# Patient Record
Sex: MALE | Race: WHITE | Employment: FULL TIME | URBAN - METROPOLITAN AREA
[De-identification: names, ages, dates, MRNs, and addresses within clinical notes are randomized per-mention and may not be internally consistent; named-entity substitution may affect disease eponyms.]

---

## 2017-01-13 PROBLEM — J18.9 PNEUMONIA OF RIGHT MIDDLE LOBE DUE TO INFECTIOUS ORGANISM: Status: ACTIVE | Noted: 2017-01-13

## 2021-11-16 PROBLEM — J18.9 PNEUMONIA OF RIGHT MIDDLE LOBE DUE TO INFECTIOUS ORGANISM: Status: RESOLVED | Noted: 2017-01-13 | Resolved: 2021-11-16

## 2021-11-29 PROBLEM — Z13.220 SCREENING FOR LIPID DISORDERS: Status: ACTIVE | Noted: 2021-11-29

## 2021-11-29 PROBLEM — R94.31 Q WAVES SUGGESTIVE OF PREVIOUS MYOCARDIAL INFARCTION: Status: ACTIVE | Noted: 2021-11-29

## 2021-11-29 PROBLEM — R07.9 CHEST PAIN: Status: ACTIVE | Noted: 2021-11-29

## 2021-11-30 ENCOUNTER — HOSPITAL ENCOUNTER (OUTPATIENT)
Dept: LAB | Age: 46
Discharge: HOME | End: 2021-11-30
Payer: COMMERCIAL

## 2021-11-30 DIAGNOSIS — R07.9 CHEST PAIN, UNSPECIFIED TYPE: ICD-10-CM

## 2021-11-30 DIAGNOSIS — Z13.220 SCREENING FOR LIPID DISORDERS: ICD-10-CM

## 2021-11-30 LAB
ANION GAP SERPL CALC-SCNC: 8 MMOL/L (ref 7–16)
BASOPHILS # BLD: 0.1 K/UL (ref 0–0.2)
BASOPHILS NFR BLD: 1 % (ref 0–2)
BUN SERPL-MCNC: 14 MG/DL (ref 6–23)
CALCIUM SERPL-MCNC: 9.3 MG/DL (ref 8.3–10.4)
CHLORIDE SERPL-SCNC: 105 MMOL/L (ref 98–107)
CHOLEST SERPL-MCNC: 168 MG/DL
CO2 SERPL-SCNC: 26 MMOL/L (ref 21–32)
CREAT SERPL-MCNC: 0.97 MG/DL (ref 0.8–1.5)
DIFFERENTIAL METHOD BLD: NORMAL
EOSINOPHIL # BLD: 0.3 K/UL (ref 0–0.8)
EOSINOPHIL NFR BLD: 4 % (ref 0.5–7.8)
ERYTHROCYTE [DISTWIDTH] IN BLOOD BY AUTOMATED COUNT: 12.2 % (ref 11.9–14.6)
GLUCOSE SERPL-MCNC: 128 MG/DL (ref 65–100)
HCT VFR BLD AUTO: 43.8 % (ref 41.1–50.3)
HDLC SERPL-MCNC: 27 MG/DL (ref 40–60)
HDLC SERPL: 6.2 {RATIO}
HGB BLD-MCNC: 15.1 G/DL (ref 13.6–17.2)
IMM GRANULOCYTES # BLD AUTO: 0 K/UL (ref 0–0.5)
IMM GRANULOCYTES NFR BLD AUTO: 0 % (ref 0–5)
INR PPP: 0.9
LDLC SERPL CALC-MCNC: ABNORMAL MG/DL
LDLC SERPL DIRECT ASSAY-MCNC: 70 MG/DL
LYMPHOCYTES # BLD: 1.7 K/UL (ref 0.5–4.6)
LYMPHOCYTES NFR BLD: 27 % (ref 13–44)
MCH RBC QN AUTO: 29.4 PG (ref 26.1–32.9)
MCHC RBC AUTO-ENTMCNC: 34.5 G/DL (ref 31.4–35)
MCV RBC AUTO: 85.4 FL (ref 79.6–97.8)
MONOCYTES # BLD: 0.3 K/UL (ref 0.1–1.3)
MONOCYTES NFR BLD: 5 % (ref 4–12)
NEUTS SEG # BLD: 4 K/UL (ref 1.7–8.2)
NEUTS SEG NFR BLD: 63 % (ref 43–78)
NRBC # BLD: 0 K/UL (ref 0–0.2)
PLATELET # BLD AUTO: 238 K/UL (ref 150–450)
PMV BLD AUTO: 10.8 FL (ref 9.4–12.3)
POTASSIUM SERPL-SCNC: 3.5 MMOL/L (ref 3.5–5.1)
PROTHROMBIN TIME: 12.9 SEC (ref 12.6–14.5)
RBC # BLD AUTO: 5.13 M/UL (ref 4.23–5.6)
SODIUM SERPL-SCNC: 139 MMOL/L (ref 138–145)
TRIGL SERPL-MCNC: 639 MG/DL (ref 35–150)
VLDLC SERPL CALC-MCNC: 127.8 MG/DL (ref 6–23)
WBC # BLD AUTO: 6.4 K/UL (ref 4.3–11.1)

## 2021-11-30 PROCEDURE — 85610 PROTHROMBIN TIME: CPT

## 2021-11-30 PROCEDURE — 36415 COLL VENOUS BLD VENIPUNCTURE: CPT

## 2021-11-30 PROCEDURE — 83721 ASSAY OF BLOOD LIPOPROTEIN: CPT

## 2021-11-30 PROCEDURE — 85025 COMPLETE CBC W/AUTO DIFF WBC: CPT

## 2021-11-30 PROCEDURE — 80048 BASIC METABOLIC PNL TOTAL CA: CPT

## 2021-11-30 PROCEDURE — 80061 LIPID PANEL: CPT

## 2021-12-01 NOTE — PROGRESS NOTES
Advised patient of lipid panel results and Dr. Dianelys Parry response. Advised patient to follow low saturated fat, low cholesterol diet. Patient verbalized understanding. He states he spoke with Tona Brito in [facility] lab, yesterday at 5:02 pm, and plans to call her back, now, to schedule cath for tomorrow. He states that he ate about 3 lbs of pork barbeque in few days prior to lipid panel.  Ashok Aljeandro RN

## 2021-12-01 NOTE — PROGRESS NOTES
Fifi Ann MD  [facility] Triage  His precath labs are acceptable for an angiogram; however, the patient is not scheduled for an angiogram for an unknown reason. His TG are elevated, so he will need a fasting lipid panel to be scheduled once he sees me after cath. [facility] facilitate scheduling the patient's angiogram and follow up with me post-cath.         Left message on voicemail for patient to call this triage nurse. Recording at home number states \"no longer in service\". Scheduling Dept was notified by Dinah Mo to schedule cath, earlier today.  Chanell Mace RN

## 2021-12-03 ENCOUNTER — HOSPITAL ENCOUNTER (OUTPATIENT)
Age: 46
Setting detail: OUTPATIENT SURGERY
Discharge: HOME | End: 2021-12-03
Attending: INTERNAL MEDICINE | Admitting: INTERNAL MEDICINE
Payer: COMMERCIAL

## 2021-12-03 VITALS
BODY MASS INDEX: 25.73 KG/M2 | SYSTOLIC BLOOD PRESSURE: 112 MMHG | OXYGEN SATURATION: 96 % | HEIGHT: 72 IN | WEIGHT: 190 LBS | HEART RATE: 78 BPM | DIASTOLIC BLOOD PRESSURE: 68 MMHG

## 2021-12-03 DIAGNOSIS — R94.31 Q WAVES SUGGESTIVE OF PREVIOUS MYOCARDIAL INFARCTION: ICD-10-CM

## 2021-12-03 DIAGNOSIS — R07.9 CHEST PAIN, UNSPECIFIED TYPE: ICD-10-CM

## 2021-12-03 DIAGNOSIS — R07.9 CHEST PAIN: ICD-10-CM

## 2021-12-03 LAB
ATRIAL RATE: 74 BPM
CALCULATED P AXIS, ECG09: 25 DEGREES
CALCULATED R AXIS, ECG10: 46 DEGREES
CALCULATED T AXIS, ECG11: 30 DEGREES
DIAGNOSIS, 93000: NORMAL
P-R INTERVAL, ECG05: 148 MS
Q-T INTERVAL, ECG07: 390 MS
QRS DURATION, ECG06: 86 MS
QTC CALCULATION (BEZET), ECG08: 432 MS
VENTRICULAR RATE, ECG03: 74 BPM

## 2021-12-03 PROCEDURE — 93458 L HRT ARTERY/VENTRICLE ANGIO: CPT | Performed by: INTERNAL MEDICINE

## 2021-12-03 PROCEDURE — 77030016699 HC CATH ANGI DX INFN1 CARD -A: Performed by: INTERNAL MEDICINE

## 2021-12-03 PROCEDURE — 74011000636 HC RX REV CODE- 636: Performed by: INTERNAL MEDICINE

## 2021-12-03 PROCEDURE — 74011250636 HC RX REV CODE- 250/636: Performed by: INTERNAL MEDICINE

## 2021-12-03 PROCEDURE — 77030042317 HC BND COMPR HEMSTAT -B: Performed by: INTERNAL MEDICINE

## 2021-12-03 PROCEDURE — 93005 ELECTROCARDIOGRAM TRACING: CPT | Performed by: INTERNAL MEDICINE

## 2021-12-03 PROCEDURE — 74011250637 HC RX REV CODE- 250/637: Performed by: INTERNAL MEDICINE

## 2021-12-03 PROCEDURE — 99152 MOD SED SAME PHYS/QHP 5/>YRS: CPT | Performed by: INTERNAL MEDICINE

## 2021-12-03 PROCEDURE — C1894 INTRO/SHEATH, NON-LASER: HCPCS | Performed by: INTERNAL MEDICINE

## 2021-12-03 PROCEDURE — 74011000250 HC RX REV CODE- 250: Performed by: INTERNAL MEDICINE

## 2021-12-03 PROCEDURE — 77030015766: Performed by: INTERNAL MEDICINE

## 2021-12-03 PROCEDURE — C1769 GUIDE WIRE: HCPCS | Performed by: INTERNAL MEDICINE

## 2021-12-03 RX ORDER — HEPARIN SODIUM 200 [USP'U]/100ML
INJECTION, SOLUTION INTRAVENOUS
Status: COMPLETED | OUTPATIENT
Start: 2021-12-03 | End: 2021-12-03

## 2021-12-03 RX ORDER — SODIUM CHLORIDE 9 MG/ML
75 INJECTION, SOLUTION INTRAVENOUS CONTINUOUS
Status: DISCONTINUED | OUTPATIENT
Start: 2021-12-03 | End: 2021-12-03 | Stop reason: HOSPADM

## 2021-12-03 RX ORDER — GUAIFENESIN 100 MG/5ML
81-324 LIQUID (ML) ORAL
Status: COMPLETED | OUTPATIENT
Start: 2021-12-03 | End: 2021-12-03

## 2021-12-03 RX ORDER — LIDOCAINE HYDROCHLORIDE 10 MG/ML
INJECTION INFILTRATION; PERINEURAL AS NEEDED
Status: DISCONTINUED | OUTPATIENT
Start: 2021-12-03 | End: 2021-12-03 | Stop reason: HOSPADM

## 2021-12-03 RX ORDER — MIDAZOLAM HYDROCHLORIDE 1 MG/ML
INJECTION, SOLUTION INTRAMUSCULAR; INTRAVENOUS AS NEEDED
Status: DISCONTINUED | OUTPATIENT
Start: 2021-12-03 | End: 2021-12-03 | Stop reason: HOSPADM

## 2021-12-03 RX ADMIN — SODIUM CHLORIDE 75 ML/HR: 900 INJECTION, SOLUTION INTRAVENOUS at 08:10

## 2021-12-03 RX ADMIN — ASPIRIN 324 MG: 81 TABLET, CHEWABLE ORAL at 08:10

## 2021-12-03 NOTE — Clinical Note
TRANSFER - IN REPORT:     Verbal report received from: [facility] RN . Report consisted of patient's Situation, Background, Assessment and   Recommendations(SBAR). Opportunity for questions and clarification was provided. Assessment completed upon patient's arrival to unit and care assumed. Patient transported with a Registered Nurse.

## 2021-12-03 NOTE — PROGRESS NOTES
1145-patient ambulated to bath room with no difficulties. Right radial with no bleeding or hematoma. R band removed and sterile dressing applied to site    1150- all discharge instructions explained to patient and family. Time allowed for questions no. No questions and concerns at this time. 1155- right radial checked. Site with no redness or bleeding. pt discharged to home via Wheelchair with family.

## 2021-12-03 NOTE — PROGRESS NOTES
TRANSFER - OUT REPORT:    Verbal report given to Cyndy RN(name) on [employer]  being transferred to [facility](unit) for routine progression of care       Report consisted of patients Situation, Background, Assessment and   Recommendations(SBAR). Information from the following report(s) SBAR was reviewed with the receiving nurse.     [facility] with Dr Kendell Mccord  No interventions  R Radial  TR band at 12mL  Versed 2mg  Heparin 5000 units in radial cocktail

## 2021-12-03 NOTE — PROGRESS NOTES
TRANSFER - IN REPORT:    Verbal report received from [facility] on [employer]  being received from [facility] for routine progression of care      Report consisted of patients Situation, Background, Assessment and   Recommendations(SBAR). Information from the following report(s) SBAR was reviewed with the receiving nurse. Opportunity for questions and clarification was provided. Assessment completed upon patients arrival to unit and care assumed.

## 2021-12-03 NOTE — DISCHARGE INSTRUCTIONS
HEART CATHETERIZATION/ANGIOGRAPHY DISCHARGE INSTRUCTIONS    1. Check puncture site frequently for swelling or bleeding. If there is any bleeding, apply pressure over the area with a clean towel or washcloth and call 911. Notify your doctor for any redness, swelling, drainage, or oozing from the puncture site. Notify your doctor for any fever or chills. 2. If the extremity becomes cold, numb, or painful call [facility] at [phone].  3. Activity should be limited for the next 48 hours. No heavy lifting, pushing, pulling  or strenuous activity for 48 hours. No heavy lifting (anything over 10 pounds) for 3 days. 4. You may resume your usual diet. Drink more fluids than usual.  5. Have a responsible person drive you home and stay with you for at least 24 hours after your heart catheterization/angiography. 6. You may remove bandage from your ARM in 24 hours. You may shower in 24 hours. No tub baths, hot tubs, or swimming for 1 week. Do not place any lotions, creams, powders, or ointments over puncture site for 1 week. You may place a clean band-aid over the puncture site each day for 5 days. Change daily. 7. Continue all medications as prescribed. Sedation for a Medical Procedure: Care Instructions     You were given a sedative medication during your visit. While many of the effects will have worn   off before you leave; you may continue to feel some effects for several hours. Common side effects from sedation include:  · Feeling sleepy. (Your doctors and nurses will make sure you are not too sleepy to go home.)  · Nausea and vomiting. This usually does not last long. · Feeling tired. How can you care for yourself at home? Activity    · Don't do anything for 24 hours that requires attention to detail. It takes time for the medicine effects to completely wear off. · Do not make important legal decisions for 24 hours. · Do not sign any legal documents for 24 hours.      · Do not drink alcohol today     · For your safety, you should not drive or operate heavy machinery for the remainder of the day     · Rest when you feel tired. Getting enough sleep will help you recover. Diet    · You can eat your normal diet, unless your doctor gives you other instructions. If your stomach is upset, try clear liquids and bland, low-fat foods like plain toast or rice. · Drink plenty of fluids (unless your doctor tells you not to). · Don't drink alcohol for 24 hours. Medicines    · Be safe with medicines. Read and follow all instructions on the label. · If the doctor gave you a prescription medicine for pain, take it as prescribed. · If you are not taking a prescription pain medicine, ask your doctor if you can take an over-the-counter medicine. · If you think your pain medicine is making you sick to your stomach:  · Take your medicine after meals (unless your doctor has told you not to). · Ask your doctor for a different pain medicine. I have read the above instructions and have had the opportunity to ask questions.       Patient: ________________________   Date: _____________    Witness: _______________________   Date: _____________

## 2021-12-03 NOTE — H&P
Meghan Phan MD   Physician   Specialty:  Cardiology   Progress Notes       Signed   Encounter Date:  11/29/2021                       []Hide copied text    []Mary for details      [facility] History & Physical                                                              Reason for Visit: Chest pain     Subjective:      Patient is a 51 y.o. male who presents as a referral for chest pain. The patient reports chest pain in the \"middle of my chest\" that occurred for 1 episode about 2 weeks ago. He says that the sensation lasted 40 minutes. There were no alleviating or provoking factors. He reports having a prior episode of chest pain about 6 months ago. He denies hemoptysis, dyspnea and palpitations.           Past Medical History:   Diagnosis Date    GERD (gastroesophageal reflux disease)       OTC meds as needed    Pneumonia of right middle lobe due to infectious organism 1/13/2017            Past Surgical History:   Procedure Laterality Date    HX KNEE ARTHROSCOPY        HX OTHER SURGICAL   October 2015     Neck- Bracket placed            Family History   Problem Relation Age of Onset    Diabetes Maternal Grandfather      Heart Disease Maternal Grandfather      Dementia Father      Cancer Father      Alzheimer's Disease Paternal Grandmother      Cancer Paternal Grandfather           brain      Social History            Tobacco Use    Smoking status: Current Every Day Smoker       Packs/day: 0.50       Years: 15.00       Pack years: 7.50    Smokeless tobacco: Current User    Tobacco comment: Dip   Substance Use Topics    Alcohol use:  Yes       Comment: occ      No Known Allergies        ROS:  No obvious pertinent positives on review of systems except for what was outlined above.        Objective:         Visit Vitals  BP (!) 148/100   Pulse 81   Ht 6' (1.829 m)   Wt 202 lb 3.2 oz (91.7 kg)   BMI 27.42 kg/m²             BP Readings from Last 3 Encounters:   11/29/21 (!) 148/100 11/16/21 110/64   10/07/20 111/78             Wt Readings from Last 3 Encounters:   11/29/21 202 lb 3.2 oz (91.7 kg)   11/16/21 200 lb (90.7 kg)   10/07/20 196 lb (88.9 kg)         General/Constitutional:   Alert and oriented x 3, no acute distress  HEENT:   normocephalic, atraumatic, moist mucous membranes  Neck:   No JVD or carotid bruits bilaterally  Cardiovascular:   regular rate and rhythm, no rub/gallop appreciated  Pulmonary:   clear to auscultation bilaterally, no respiratory distress  Abdomen:   soft, non-tender, non-distended  Ext:   No sig LE edema bilaterally  Skin:  warm and dry, no obvious rashes seen  Neuro:   no obvious sensory or motor deficits  Psychiatric:   normal mood and affect        ECG:   Sinus rhythm  Anterior infarct (age indeterminate or probably old)  Heart rate 82 bpm     Data Review:   LIPID PANEL   No results for input(s): CHOL, CHOLPOCT, HDL, LDL, LDLC, LDLCPOC, LDLCEXT, TRIGL, TGLPOCT, CHHD, CHHDX in the last 7224 hours.     Invalid input(s): HCLPOC, LDLCHOL, LDLPOCT         BMP No results for input(s): NA, K, CL, CO2, AGAP, GLU, BUN, CREA, BUCR, GFRAA, GFRNA, CA, GFRAA in the last 7224 hours.     Invalid input(s): [id number], [id number], KP, CLP, CO2P, GLUC, BUNPL, CREAP, CAPL      Assessment/Plan:   1. Chest pain, unspecified type  - In the setting of Q waves in the anterior leads, it is reasonable to pursue an angiogram with a high pretest probability for CAD  - Obtain precath labs and an angiogram pending precath labs     2. Screening for lipid disorders  - Obtain a lipid panel     3.  Q waves suggestive of previous myocardial infarction  - Continue with baby aspirin daily     F/U: Post cath     Mile Awad MD          Electronically signed by Sherita Francis MD at 11/29/21 1732        Note Details    Author Sherita Francis MD File Time 11/29/21 1732   Author Type Physician Status Signed   Last  Sherita Francis MD Specialty Cardiology    Office Visit on 11/29/2021           Office Visit on 11/29/2021                Detailed Report            Note shared with patient

## 2021-12-03 NOTE — Clinical Note
Contrast Dose Calculator:   Patient's age: 51.   Patient's sex: Male. Patient weight (kg) = 86.2. Creatinine level (mg/dL) = 0.97. Creatinine clearance (mL/min): 114.79. Contrast concentration (mg/mL) = 370. MACD = 300 mL. Max Contrast dose per Creatinine Cl calculator = 258.27 mL.

## 2021-12-03 NOTE — PROGRESS NOTES
Patient received to [address] [place] [address] room # 14  Ambulatory from [facility]. Patient scheduled for [facility] today with Dr Zee Canales. Procedure reviewed & questions answered, voiced good understanding consent obtained & placed on chart. All medications and medical history reviewed. Will prep patient per orders. Patient & family updated on plan of care.       The patient has a fraility score of 3-MANAGING WELL, based on age and abilities

## 2021-12-03 NOTE — Clinical Note
TRANSFER - OUT REPORT:     Verbal report given to: [facility] RN . Report consisted of patient's Situation, Background, Assessment and   Recommendations(SBAR). Opportunity for questions and clarification was provided. Patient transported with a Registered Nurse. Patient transported to: recovery.

## 2021-12-08 PROBLEM — E66.3 OVERWEIGHT (BMI 25.0-29.9): Status: ACTIVE | Noted: 2021-12-08

## 2021-12-08 PROBLEM — E78.5 HYPERLIPIDEMIA: Status: ACTIVE | Noted: 2021-12-08

## 2021-12-08 PROBLEM — I25.10 ENDOTHELIAL DYSFUNCTION OF CORONARY ARTERY: Status: ACTIVE | Noted: 2021-12-08

## 2021-12-08 PROBLEM — R07.89 ATYPICAL CHEST PAIN: Status: ACTIVE | Noted: 2021-11-29

## 2021-12-29 PROBLEM — Z13.220 SCREENING FOR LIPID DISORDERS: Status: RESOLVED | Noted: 2021-11-29 | Resolved: 2021-12-29

## 2022-03-18 PROBLEM — I25.10 ENDOTHELIAL DYSFUNCTION OF CORONARY ARTERY: Status: ACTIVE | Noted: 2021-12-08

## 2022-03-19 PROBLEM — E78.5 HYPERLIPIDEMIA: Status: ACTIVE | Noted: 2021-12-08

## 2022-03-19 PROBLEM — R07.89 ATYPICAL CHEST PAIN: Status: ACTIVE | Noted: 2021-11-29

## 2022-03-19 PROBLEM — R94.31 Q WAVES SUGGESTIVE OF PREVIOUS MYOCARDIAL INFARCTION: Status: ACTIVE | Noted: 2021-11-29

## 2022-03-20 PROBLEM — E66.3 OVERWEIGHT (BMI 25.0-29.9): Status: ACTIVE | Noted: 2021-12-08

## 2022-07-18 ENCOUNTER — TELEPHONE (OUTPATIENT)
Dept: INTERNAL MEDICINE CLINIC | Facility: CLINIC | Age: 47
End: 2022-07-18

## 2022-07-22 ENCOUNTER — HOSPITAL ENCOUNTER (OUTPATIENT)
Dept: GENERAL RADIOLOGY | Age: 47
Discharge: HOME | End: 2022-07-25
Payer: COMMERCIAL

## 2022-07-22 ENCOUNTER — OFFICE VISIT (OUTPATIENT)
Dept: INTERNAL MEDICINE CLINIC | Facility: CLINIC | Age: 47
End: 2022-07-22
Payer: COMMERCIAL

## 2022-07-22 VITALS
WEIGHT: 198 LBS | OXYGEN SATURATION: 98 % | BODY MASS INDEX: 26.82 KG/M2 | RESPIRATION RATE: 16 BRPM | SYSTOLIC BLOOD PRESSURE: 120 MMHG | TEMPERATURE: 97.4 F | HEIGHT: 72 IN | HEART RATE: 85 BPM | DIASTOLIC BLOOD PRESSURE: 92 MMHG

## 2022-07-22 DIAGNOSIS — M79.671 RIGHT FOOT PAIN: Primary | ICD-10-CM

## 2022-07-22 DIAGNOSIS — M76.61 RIGHT ACHILLES TENDINITIS: ICD-10-CM

## 2022-07-22 DIAGNOSIS — M79.671 RIGHT FOOT PAIN: ICD-10-CM

## 2022-07-22 DIAGNOSIS — G89.29 CHRONIC LEFT-SIDED LOW BACK PAIN, UNSPECIFIED WHETHER SCIATICA PRESENT: ICD-10-CM

## 2022-07-22 DIAGNOSIS — M54.50 CHRONIC LEFT-SIDED LOW BACK PAIN, UNSPECIFIED WHETHER SCIATICA PRESENT: ICD-10-CM

## 2022-07-22 PROCEDURE — 73630 X-RAY EXAM OF FOOT: CPT

## 2022-07-22 PROCEDURE — 99214 OFFICE O/P EST MOD 30 MIN: CPT | Performed by: NURSE PRACTITIONER

## 2022-07-22 ASSESSMENT — PATIENT HEALTH QUESTIONNAIRE - PHQ9
SUM OF ALL RESPONSES TO PHQ QUESTIONS 1-9: 0
2. FEELING DOWN, DEPRESSED OR HOPELESS: 0
SUM OF ALL RESPONSES TO PHQ QUESTIONS 1-9: 0
SUM OF ALL RESPONSES TO PHQ9 QUESTIONS 1 & 2: 0
1. LITTLE INTEREST OR PLEASURE IN DOING THINGS: 0

## 2022-07-22 ASSESSMENT — ENCOUNTER SYMPTOMS
COLOR CHANGE: 0
BACK PAIN: 1

## 2022-07-22 NOTE — PROGRESS NOTES
[facility]      2022    Patient Name: Oliverio Fitch  :  1975      Chief Complaint:  Chief Complaint   Patient presents with    Other     Sore spot of right foot with possible object;low left side back pain;right ankle heel pain. HPI  Patient presents today with complaint of right foot pain. He believes that there may be a foreign body in it. He reports stepping on a piece of glass about 6 weeks ago. He says that he was able to get a small piece of glass out of the foot immediately following the incident. However, the pain has not improved since that time, so he fears that there may still be a piece of glass in it. Patient reports chronic right achilles pain. He says that he tore his achilles about 15 years ago and has had pain off and on since that time. He reports having seen orthopedics multiple times in the past, as well as physical therapy. He also reports chronic left-sided low back pain. Symptoms started about 10-15 years ago. He says that the pain is present 60-70% of the time. He has had an MRI and physical therapy in the past for this problem.        Past Medical History:   Diagnosis Date    GERD (gastroesophageal reflux disease)     OTC meds as needed    Pneumonia of right middle lobe due to infectious organism 2017       Past Surgical History:   Procedure Laterality Date    KNEE ARTHROSCOPY      OTHER SURGICAL HISTORY  2015    Neck- Bracket placed       Family History   Problem Relation Age of Onset    Alzheimer's Disease Paternal Grandmother     Cancer Paternal Grandfather         brain    Dementia Father     Heart Disease Maternal Grandfather     Diabetes Maternal Grandfather     Cancer Father        Social History     Tobacco Use    Smoking status: Former     Packs/day: 0.50     Types: Cigarettes     Quit date: 2017     Years since quittin.5    Smokeless tobacco: Current    Tobacco comments:     Quit smoking: Dip   Substance Use Topics Alcohol use: Yes    Drug use: No         Current Outpatient Medications:     aspirin 81 MG EC tablet, Take 81 mg by mouth daily, Disp: , Rfl:     omeprazole (PRILOSEC) 40 MG delayed release capsule, Take 40 mg by mouth daily, Disp: , Rfl:     nitroGLYCERIN (NITROSTAT) 0.3 MG SL tablet, Place 0.3 mg under the tongue (Patient not taking: Reported on 7/22/2022), Disp: , Rfl:     No Known Allergies    Review of Systems   Constitutional:  Negative for chills and fever. Musculoskeletal:  Positive for back pain and gait problem. Negative for arthralgias and joint swelling. Skin:  Negative for color change and wound. Objective:  BP (!) 120/92 (Site: Left Upper Arm, Position: Sitting, Cuff Size: Small Adult)   Pulse 85   Temp 97.4 °F (36.3 °C) (Temporal)   Resp 16   Ht 6' (1.829 m)   Wt 198 lb (89.8 kg)   SpO2 98%   BMI 26.85 kg/m²     Examination:  Physical Exam  Vitals and nursing note reviewed. Constitutional:       General: He is not in acute distress. Appearance: Normal appearance. Cardiovascular:      Rate and Rhythm: Normal rate and regular rhythm. Pulses:           Dorsalis pedis pulses are 2+ on the right side and 2+ on the left side. Heart sounds: Normal heart sounds. Pulmonary:      Effort: Pulmonary effort is normal. No respiratory distress. Breath sounds: Normal breath sounds. Musculoskeletal:      Right lower leg: No edema. Left lower leg: No edema. Right ankle:      Right Achilles Tendon: Tenderness (mild) present. Feet:    Skin:     General: Skin is warm and dry. Findings: No erythema. Neurological:      Mental Status: He is alert and oriented to person, place, and time. Psychiatric:         Mood and Affect: Mood normal.         Assessment/Plan:    Roxy Peters was seen today for other. Diagnoses and all orders for this visit:    Right foot pain  -     XR FOOT RIGHT (MIN 3 VIEWS); Future  X-ray today given recently stepping on glass.  Plan of care pending imaging results. Right achilles tendinitis  Patient declines further evaluation/management at this time. Chronic left-sided low back pain  Patient declines further evaluation/management at this time. On this date, 7/22/22, I have spent 30 minutes reviewing previous notes, test results and face to face with the patient discussing the diagnosis and importance of compliance with the treatment plan as well as documenting on the day of the visit. An electronic signature was used to authenticate this note.   ADRIA DugganC

## 2022-07-26 DIAGNOSIS — S90.851A FOREIGN BODY IN RIGHT FOOT, INITIAL ENCOUNTER: Primary | ICD-10-CM

## (undated) DEVICE — CATHETER DIAG AD 5FR L110CM 145DEG COR GRY HYDRPHLC NYL

## (undated) DEVICE — GLIDESHEATH SLENDER STAINLESS STEEL KIT: Brand: GLIDESHEATH SLENDER

## (undated) DEVICE — RADIFOCUS OPTITORQUE ANGIOGRAPHIC CATHETER: Brand: OPTITORQUE

## (undated) DEVICE — GUIDEWIRE VASC L260CM DIA0.035IN RAD 3MM J TIP L7CM PTFE

## (undated) DEVICE — BAND RADIAL COMPR ARTERY 24CM -- REG BX/10